# Patient Record
(demographics unavailable — no encounter records)

---

## 2025-02-07 NOTE — HISTORY OF PRESENT ILLNESS
[FreeTextEntry1] : 42-year-old male presents for evaluation of discomfort and mild swelling of the left long finger middle phalanx which started a few weeks ago. The patient states that he did experience a fall in December of 2024 but does not remember any immediate discomfort of the left long finger after the incidence. The patient is using his been playing the [employer] more often and is on the computer for work.

## 2025-02-07 NOTE — PHYSICAL EXAM
[de-identified] : Physical exam demonstrates the patient to be alert and oriented x 3 and capable of ambulation. The patient is well-developed and well-nourished in no apparent respiratory distress. The majority of the skin is intact bilaterally in the upper extremities without any bilateral elbow lymphadenopathy.  The wrists have symmetric range of motion bilaterally. There is no tenderness over the scaphoid, scapholunate, or lunotriquetral ligaments bilaterally. There is a negative Landa's test bilaterally. There is no tenderness over the distal radial ulnar joint or TFCC and no evidence of instability bilaterally. There is no tenderness over the pisotriquetral joint, hamate hook, or CMC joints bilaterally. The patient is nontender over both scaphoids and anatomic snuffbox is bilaterally. There is no clubbing cyanosis or edema.  No gross deformity over the left hand and digits. There is mild tenderness and soft tissue swelling over the dorsal aspect left long finger middle phalanx. Nontender over the proximal phalanx or distal phalanx. Nontender over the MCP joint, PIP joint or DIP joint. No evidence of malrotation of the left-hand digits. Flexor and extensor tendons are intact. There is good capillary refill of the digits bilaterally. Sensation is intact to light touch bilaterally. [de-identified] : PA, lateral and oblique X-Rays of the left long finger were obtained to assess for bony injury. No evidence of acute fracture or dislocation.   no radiolucent or radiopaque lesions at the left long finger middle phalanx or in the soft tissue superficial to it

## 2025-02-07 NOTE — ASSESSMENT
[FreeTextEntry1] : I informed the patient that he most likely has an element of overuse due to his increase in fine movements while playing his guitar, Which he eluded to as well.  I explained that the fullness over the dorsum of the left long finger middle phalanx is a bit unclear there may be secondary to inflammation or contusion. I therefore recommended conservative treatment with activity modification, balaji splinting, OTC anti-inflammatory medication to help manage his symptoms.  I directed the patient to follow back up with me if he notices worsening swelling, redness or pain at the dorsum of the left long finger middle phalanx.  I explained that soft tissue masses can be present in that region as well although this is not appreciated on today's visit. The patient was in accordance with the plan and all questions were answered.

## 2025-05-09 NOTE — HISTORY OF PRESENT ILLNESS
[FreeTextEntry1] : The patient is returning for follow-up regarding __ He was noted to have a fullness of the dorsum of the left long finger middle phalanx but no discrete palpable soft tissue mass and there may be an element of hand overuse with playing his guitar.